# Patient Record
Sex: FEMALE | Race: WHITE
[De-identification: names, ages, dates, MRNs, and addresses within clinical notes are randomized per-mention and may not be internally consistent; named-entity substitution may affect disease eponyms.]

---

## 2017-12-01 ENCOUNTER — HOSPITAL ENCOUNTER (OUTPATIENT)
Dept: HOSPITAL 45 - C.MAMM | Age: 62
Discharge: HOME | End: 2017-12-01
Attending: FAMILY MEDICINE
Payer: COMMERCIAL

## 2017-12-01 DIAGNOSIS — Z12.31: Primary | ICD-10-CM

## 2017-12-01 NOTE — MAMMOGRAPHY REPORT
BILATERAL DIGITAL SCREENING MAMMOGRAM TOMOSYNTHESIS WITH CAD: 12/1/2017

CLINICAL HISTORY: Routine screening.  





TECHNIQUE:  Breast tomosynthesis in addition to standard 2D mammography was performed. Current study 
was also evaluated with a Computer Aided Detection (CAD) system.  



COMPARISON: Comparison is made to exams dated:  5/15/2015 mammogram, 11/13/2013 mammogram, 7/3/2012 m
ammogram, 5/26/2011 mammogram, 3/8/2010 mammogram - [facility], and 3/7/2008 mammog
del[place].   



BREAST COMPOSITION:  The tissue of both breasts is heterogeneously dense, which may obscure small mas
ses.  



FINDINGS:  No suspicious masses, calcifications, or areas of architectural distortion are noted in ei
ther breast. There has been no significant interval change compared to prior exams.  





IMPRESSION:  ACR BI-RADS CATEGORY 1: NEGATIVE

There is no mammographic evidence of malignancy. A 1 year screening mammogram is recommended.  The pa
tient will receive written notification of the results.  





Approximately 10% of breast cancers are not detected with mammography. A negative mammographic report
 should not delay biopsy if a clinically suggestive mass is present.



Katina Batista M.D.          

ah/:12/1/2017 15:34:38  



Imaging Technologist: Henry ARTIS(FLORES)(SEB), [facility]

letter sent: Normal 1/2  

BI-RADS Code: ACR BI-RADS Category 1: Negative

## 2018-04-26 ENCOUNTER — HOSPITAL ENCOUNTER (OUTPATIENT)
Dept: HOSPITAL 45 - C.RAD1850 | Age: 63
Discharge: HOME | End: 2018-04-26
Attending: FAMILY MEDICINE
Payer: COMMERCIAL

## 2018-04-26 DIAGNOSIS — M85.88: Primary | ICD-10-CM

## 2018-04-26 NOTE — DIAGNOSTIC IMAGING REPORT
LUMBAR SPINE 5 VIEWS



CLINICAL HISTORY: Low back pain.



FINDINGS: 5 views of the lumbar spine are  obtained. No prior studies are

available for comparison at the time of dictation.  The skeletal structures are

osteopenic. There is no radiographic evidence of fracture or malalignment.

Vertebral body height and alignment are maintained. The transverse and spinous

processes are intact. There is no evidence of spondylolysis. There is moderate

to advanced disc space narrowing at L5-S1 with associated endplate sclerosis and

a posterior disc osteophyte complex. Only mild disc space narrowing is seen at

the remaining lumbar levels. Endplate sclerosis is also seen at L3-L4. Tiny

anterior osteophytes are seen throughout. The visualized bony pelvis appears

intact. There is a nonobstructed abdominal bowel gas pattern. Phlebolith are

noted in the pelvis.



IMPRESSION:



1. No acute bony abnormality is seen involving the lumbosacral spine.



2. Osteopenia and mild degenerative change as above.







Electronically signed by:  Nolberto Chu M.D.

4/26/2018 9:23 AM



Dictated Date/Time:  4/26/2018 9:21 AM